# Patient Record
Sex: FEMALE | Race: WHITE | NOT HISPANIC OR LATINO | Employment: PART TIME | URBAN - METROPOLITAN AREA
[De-identification: names, ages, dates, MRNs, and addresses within clinical notes are randomized per-mention and may not be internally consistent; named-entity substitution may affect disease eponyms.]

---

## 2017-02-21 ENCOUNTER — OFFICE VISIT (OUTPATIENT)
Dept: NEUROLOGY | Facility: MEDICAL CENTER | Age: 47
End: 2017-02-21
Payer: COMMERCIAL

## 2017-02-21 VITALS
OXYGEN SATURATION: 99 % | HEART RATE: 80 BPM | TEMPERATURE: 98.2 F | SYSTOLIC BLOOD PRESSURE: 106 MMHG | BODY MASS INDEX: 20.16 KG/M2 | HEIGHT: 65 IN | DIASTOLIC BLOOD PRESSURE: 66 MMHG | WEIGHT: 121 LBS | RESPIRATION RATE: 16 BRPM

## 2017-02-21 PROCEDURE — 64615 CHEMODENERV MUSC MIGRAINE: CPT | Performed by: NURSE PRACTITIONER

## 2017-02-21 ASSESSMENT — PATIENT HEALTH QUESTIONNAIRE - PHQ9: CLINICAL INTERPRETATION OF PHQ2 SCORE: 0

## 2017-02-21 ASSESSMENT — ENCOUNTER SYMPTOMS
HEADACHES: 1
NERVOUS/ANXIOUS: 0
VOMITING: 0
NAUSEA: 0
MUSCULOSKELETAL NEGATIVE: 1
SORE THROAT: 0
COUGH: 0
DIARRHEA: 0
DEPRESSION: 0
DOUBLE VISION: 0
CONSTITUTIONAL NEGATIVE: 1
DIZZINESS: 0
ABDOMINAL PAIN: 0
SEIZURES: 0

## 2017-02-21 NOTE — PROGRESS NOTES
"Subjective:      Yu Coley is a 46 y.o. female who presents with No chief complaint on file.          HPI Here for Botox today. Doing very well and wishes to continue Botox as maintenance.  Has Imitrex for abortive treatment if needed.    Current Outpatient Prescriptions   Medication Sig Dispense Refill   • sumatriptan (IMITREX) 100 MG tablet Take 1/2-1 tablet po at onset of headache, may repeat dose in 2 hours if unrelieved.  Do not exceed more than 2 tablets in 24 hours. 9 Tab 5     No current facility-administered medications for this visit.         Review of Systems   Constitutional: Negative.    HENT: Negative for hearing loss, nosebleeds and sore throat.         No recent head injury.   Eyes: Negative for double vision.        No new loss of vision.   Respiratory: Negative for cough.         No recent lung infections.   Cardiovascular: Negative for chest pain.   Gastrointestinal: Negative for nausea, vomiting, abdominal pain and diarrhea.   Genitourinary: Negative.    Musculoskeletal: Negative.    Skin: Negative.    Neurological: Positive for headaches. Negative for dizziness and seizures.   Endo/Heme/Allergies:        No history of endocrine dysfunction.  No new problems.   Psychiatric/Behavioral: Negative for depression. The patient is not nervous/anxious.         No recent mood changes.          Objective:     /66 mmHg  Pulse 80  Temp(Src) 36.8 °C (98.2 °F)  Resp 16  Ht 1.651 m (5' 5\")  Wt 54.885 kg (121 lb)  BMI 20.14 kg/m2  SpO2 99%     Physical Exam   Constitutional: She is oriented to person, place, and time. She appears well-developed and well-nourished. No distress.   HENT:   Head: Normocephalic and atraumatic.   Nose: Nose normal.   Eyes: EOM are normal.   Neck: Normal range of motion.   Cardiovascular: Normal rate and regular rhythm.    Pulmonary/Chest: Effort normal.   Neurological: She is alert and oriented to person, place, and time. She exhibits normal muscle tone. Gait " normal.   No observable changes in neurologic status.  See initial new patient examination for details.    Skin: Skin is warm.   Psychiatric: She has a normal mood and affect.             Assessment/Plan:     Chronic Migraine:  Botox therapy has reduced patient’s migraines by more than 7 days and/or 100 hours per month.    I treated this patient in clinic today with BotoxA 155 units according to the dosing/injection paradigm currently mandated by the FDA for the management of chronic migraine. Specifically, I injected 5 units to the procerus, 5 units to the corrugators bilaterally, a total of 20 units to the frontalis musculature, 20 units to the temporalis bilaterally, 15 units to the occipitalis bilaterally, 10 units to the cervical paraspinals bilaterally and 15 units to the trapezius musculature bilaterally. The remainder of the Botox 200 units mixed but not administered was discarded as wastage per FDA guidelines.    Return for follow-up in 3 months for 9th set of Botox.

## 2017-02-21 NOTE — MR AVS SNAPSHOT
"        Yu Coley   2017 8:00 AM   Office Visit   MRN: [id number]    Department:  [facility]   Dept Phone:  [phone]    Description:  Female : 1970   Provider:  SKY Patiño           Allergies as of 2017     Allergen Noted Reactions    Aspirin 10/23/2009   Hives      You were diagnosed with     Chronic migraine   [[id number]]         Vital Signs     Blood Pressure Pulse Temperature Respirations Height Weight    106/66 mmHg 80 36.8 °C (98.2 °F) 16 1.651 m (5' 5\") 54.885 kg (121 lb)    Body Mass Index Oxygen Saturation Smoking Status             20.14 kg/m2 99% Never Smoker          Basic Information     Date Of Birth Sex Race Ethnicity Preferred Language    1970 Female White Non- English      Your appointments     May 17, 2017  8:00 AM   BOTOX with SKY Patiño   [facility] (--)    [address], [address]  [facility] [id number] [phone]              Problem List              ICD-10-CM Priority Class Noted - Resolved    Headache, common migraine G43.009   10/8/2013 - Present    Vitamin D deficiency disease E55.9   10/8/2013 - Present    Routine adult health maintenance Z00.00   2014 - Present      Health Maintenance        Date Due Completion Dates    MAMMOGRAM 2/3/2016 2/3/2015, 2013, 10/8/2011 (N/S), 2010, 2009, 2009    Override on 10/8/2011: (N/S)    IMM INFLUENZA (1) 2016 ---    PAP SMEAR 2017 (Prv Comp), 10/30/2012 (N/S)    Override on 2014: Previously completed (Dr. Posadas, normal)    Override on 10/30/2012: (N/S)    IMM DTaP/Tdap/Td Vaccine (2 - Td) 2021            Current Immunizations     Tdap Vaccine 2011      Below and/or attached are the medications your provider expects you to take. Review all of your home medications and newly ordered medications with your provider and/or pharmacist. Follow medication instructions as directed by " your provider and/or pharmacist. Please keep your medication list with you and share with your provider. Update the information when medications are discontinued, doses are changed, or new medications (including over-the-counter products) are added; and carry medication information at all times in the event of emergency situations     Allergies:  ASPIRIN - Hives               Medications  Valid as of: February 21, 2017 -  9:02 AM    Generic Name Brand Name Tablet Size Instructions for use    SUMAtriptan Succinate (Tab) IMITREX 100 MG Take 1/2-1 tablet po at onset of headache, may repeat dose in 2 hours if unrelieved.  Do not exceed more than 2 tablets in 24 hours.        .                 Medicines prescribed today were sent to:     [facility] [place] [id number] [place], [place] - [address] AT [facility] OF [employer] [facility] & [place]    [address] [place] [address]    Phone: [phone] Fax: [phone]    Open 24 Hours?: No      Medication refill instructions:       If your prescription bottle indicates you have medication refills left, it is not necessary to call your provider’s office. Please contact your pharmacy and they will refill your medication.    If your prescription bottle indicates you do not have any refills left, you may request refills at any time through one of the following ways: The online [employer] system (except Urgent Care), by calling your provider’s office, or by asking your pharmacy to contact your provider’s office with a refill request. Medication refills are processed only during regular business hours and may not be available until the next business day. Your provider may request additional information or to have a follow-up visit with you prior to refilling your medication.   *Please Note: Medication refills are assigned a new Rx number when refilled electronically. Your pharmacy may indicate that no refills were authorized even though a new prescription for the same medication is  available at the pharmacy. Please request the medicine by name with the pharmacy before contacting your provider for a refill.           MyChart Access Code: Activation code not generated  Current MyChart Status: Active

## 2017-03-30 ENCOUNTER — HOSPITAL ENCOUNTER (OUTPATIENT)
Dept: RADIOLOGY | Facility: MEDICAL CENTER | Age: 47
End: 2017-03-30
Attending: OBSTETRICS & GYNECOLOGY
Payer: COMMERCIAL

## 2017-03-30 DIAGNOSIS — N63.10 LUMP OF RIGHT BREAST: ICD-10-CM

## 2017-03-30 PROCEDURE — G0204 DX MAMMO INCL CAD BI: HCPCS

## 2017-03-30 PROCEDURE — 76642 ULTRASOUND BREAST LIMITED: CPT | Mod: RT

## 2017-05-17 ENCOUNTER — OFFICE VISIT (OUTPATIENT)
Dept: NEUROLOGY | Facility: MEDICAL CENTER | Age: 47
End: 2017-05-17
Payer: COMMERCIAL

## 2017-05-17 VITALS
SYSTOLIC BLOOD PRESSURE: 122 MMHG | WEIGHT: 118.4 LBS | HEIGHT: 65 IN | BODY MASS INDEX: 19.73 KG/M2 | DIASTOLIC BLOOD PRESSURE: 78 MMHG | TEMPERATURE: 98.4 F | HEART RATE: 93 BPM | OXYGEN SATURATION: 100 % | RESPIRATION RATE: 16 BRPM

## 2017-05-17 PROCEDURE — 64615 CHEMODENERV MUSC MIGRAINE: CPT | Performed by: NURSE PRACTITIONER

## 2017-05-17 ASSESSMENT — ENCOUNTER SYMPTOMS
SEIZURES: 0
VOMITING: 0
MUSCULOSKELETAL NEGATIVE: 1
NERVOUS/ANXIOUS: 0
HEADACHES: 1
DOUBLE VISION: 0
DEPRESSION: 0
SORE THROAT: 0
ABDOMINAL PAIN: 0
COUGH: 0
DIARRHEA: 0
CONSTITUTIONAL NEGATIVE: 1
NAUSEA: 0
DIZZINESS: 0

## 2017-05-17 NOTE — MR AVS SNAPSHOT
"        Yu Coley   2017 8:00 AM   Office Visit   MRN: [id number]    Department:  [facility]   Dept Phone:  [phone]    Description:  Female : 1970   Provider:  SKY Patiño           Reason for Visit     Botox Injection Chronic migraine      Allergies as of 2017     Allergen Noted Reactions    Aspirin 10/23/2009   Hives      You were diagnosed with     Chronic migraine   [[id number]]         Vital Signs     Blood Pressure Pulse Temperature Respirations Height Weight    122/78 mmHg 93 36.9 °C (98.4 °F) 16 1.651 m (5' 5\") 53.706 kg (118 lb 6.4 oz)    Body Mass Index Oxygen Saturation Smoking Status             19.70 kg/m2 100% Never Smoker          Basic Information     Date Of Birth Sex Race Ethnicity Preferred Language    1970 Female White Non- English      Your appointments     Aug 10, 2017  3:40 PM   BOTOX with SKY Patiño   [facility] (--)    [address] [place] [address], [address]  [facility] [id number] [phone]              Problem List              ICD-10-CM Priority Class Noted - Resolved    Headache, common migraine G43.009   10/8/2013 - Present    Vitamin D deficiency disease E55.9   10/8/2013 - Present    Routine adult health maintenance Z00.00   2014 - Present      Health Maintenance        Date Due Completion Dates    PAP SMEAR 2017 (Prv Comp), 10/30/2012 (N/S)    Override on 2014: Previously completed (Dr. Posadas, normal)    Override on 10/30/2012: (N/S)    MAMMOGRAM 3/30/2018 3/30/2017, 2/3/2015, 2013, 10/8/2011 (N/S), 2010, 2009, 2009    Override on 10/8/2011: (N/S)    IMM DTaP/Tdap/Td Vaccine (2 - Td) 2021            Current Immunizations     Tdap Vaccine 2011      Below and/or attached are the medications your provider expects you to take. Review all of your home medications and newly ordered medications with your provider and/or pharmacist. " Follow medication instructions as directed by your provider and/or pharmacist. Please keep your medication list with you and share with your provider. Update the information when medications are discontinued, doses are changed, or new medications (including over-the-counter products) are added; and carry medication information at all times in the event of emergency situations     Allergies:  ASPIRIN - Hives               Medications  Valid as of: May 17, 2017 -  8:39 AM    Generic Name Brand Name Tablet Size Instructions for use    SUMAtriptan Succinate (Tab) IMITREX 100 MG TAKE 1/2 TO 1 TABLET BY MOUTH AT ONSET OF HEADACHE, MAY REPEAT IN 2 HOURS IF UNRELIEVED*DO NOT EXCEED MORE THAN 2 TABLETS IN 24 HOURS        .                 Medicines prescribed today were sent to:     [facility] [id number]  [place], [place] - [address] AT [facility] OF [employer] & [place]    [address] [place] [address]    Phone: [phone] Fax: [phone]    Open 24 Hours?: No      Medication refill instructions:       If your prescription bottle indicates you have medication refills left, it is not necessary to call your provider’s office. Please contact your pharmacy and they will refill your medication.    If your prescription bottle indicates you do not have any refills left, you may request refills at any time through one of the following ways: The online [employer] system (except Urgent Care), by calling your provider’s office, or by asking your pharmacy to contact your provider’s office with a refill request. Medication refills are processed only during regular business hours and may not be available until the next business day. Your provider may request additional information or to have a follow-up visit with you prior to refilling your medication.   *Please Note: Medication refills are assigned a new Rx number when refilled electronically. Your pharmacy may indicate that no refills were authorized even though a new  prescription for the same medication is available at the pharmacy. Please request the medicine by name with the pharmacy before contacting your provider for a refill.        Referral     A referral request has been sent to our patient care coordination department. Please allow 3-5 business days for us to process this request and contact you either by phone or mail. If you do not hear from us by the 5th business day, please call us at [phone].           [employer] Access Code: Activation code not generated  Current [employer] Status: Active

## 2017-05-17 NOTE — PROGRESS NOTES
"Subjective:      Yu Coley is a 46 y.o. female who presents with Botox Injection            HPI  Here for Botox today.  Very pleased with current treatment plan.  Reports having one bad migraine intercurrently which is a great improvement.    Current Outpatient Prescriptions   Medication Sig Dispense Refill   • sumatriptan (IMITREX) 100 MG tablet TAKE 1/2 TO 1 TABLET BY MOUTH AT ONSET OF HEADACHE, MAY REPEAT IN 2 HOURS IF UNRELIEVED*DO NOT EXCEED MORE THAN 2 TABLETS IN 24 HOURS 9 Tab 5     No current facility-administered medications for this visit.       Review of Systems   Constitutional: Negative.    HENT: Negative for hearing loss, nosebleeds and sore throat.         No recent head injury.   Eyes: Negative for double vision.        No new loss of vision.   Respiratory: Negative for cough.         No recent lung infections.   Cardiovascular: Negative for chest pain.   Gastrointestinal: Negative for nausea, vomiting, abdominal pain and diarrhea.   Genitourinary: Negative.    Musculoskeletal: Negative.    Skin: Negative.    Neurological: Positive for headaches. Negative for dizziness and seizures.   Endo/Heme/Allergies:        No history of endocrine dysfunction.  No new problems.   Psychiatric/Behavioral: Negative for depression. The patient is not nervous/anxious.         No recent mood changes.          Objective:     /78 mmHg  Pulse 93  Temp(Src) 36.9 °C (98.4 °F)  Resp 16  Ht 1.651 m (5' 5\")  Wt 53.706 kg (118 lb 6.4 oz)  BMI 19.70 kg/m2  SpO2 100%     Physical Exam   Constitutional: She is oriented to person, place, and time. She appears well-developed and well-nourished. No distress.   HENT:   Head: Normocephalic and atraumatic.   Nose: Nose normal.   Eyes: EOM are normal.   Neck: Normal range of motion.   Cardiovascular: Normal rate and regular rhythm.    Pulmonary/Chest: Effort normal.   Neurological: She is alert and oriented to person, place, and time. She exhibits normal muscle " tone. Gait normal.   No observable changes in neurologic status.  See initial new patient examination for details.    Skin: Skin is warm.   Psychiatric: She has a normal mood and affect.               Assessment/Plan:     Chronic Migraine:  Botox therapy has reduced patient’s migraines by more than 7 days and/or 100 hours per month.    I treated this patient in clinic today with BotoxA 155 units according to the dosing/injection paradigm currently mandated by the FDA for the management of chronic migraine. Specifically, I injected 5 units to the procerus, 5 units to the corrugators bilaterally, a total of 20 units to the frontalis musculature, 20 units to the temporalis bilaterally, 15 units to the occipitalis bilaterally, 10 units to the cervical paraspinals bilaterally and 15 units to the trapezius musculature bilaterally. The remainder of the Botox 200 units mixed but not administered was discarded as wastage per FDA guidelines.    Return for follow-up in 3 months for serial set of Botox.

## 2017-08-10 ENCOUNTER — OFFICE VISIT (OUTPATIENT)
Dept: NEUROLOGY | Facility: MEDICAL CENTER | Age: 47
End: 2017-08-10
Payer: COMMERCIAL

## 2017-08-10 VITALS
TEMPERATURE: 98.4 F | DIASTOLIC BLOOD PRESSURE: 60 MMHG | BODY MASS INDEX: 19.51 KG/M2 | OXYGEN SATURATION: 99 % | RESPIRATION RATE: 16 BRPM | HEIGHT: 65 IN | HEART RATE: 100 BPM | WEIGHT: 117.1 LBS | SYSTOLIC BLOOD PRESSURE: 100 MMHG

## 2017-08-10 PROCEDURE — 64615 CHEMODENERV MUSC MIGRAINE: CPT | Performed by: NURSE PRACTITIONER

## 2017-08-10 NOTE — PROGRESS NOTES
Subjective:      Yu Coley is a 46 y.o. female who presents with Botox Injection          HPI  Here for Botox today.    Current Outpatient Prescriptions   Medication Sig Dispense Refill   • sumatriptan (IMITREX) 100 MG tablet TAKE 1/2 TO 1 TABLET BY MOUTH AT ONSET OF HEADACHE, MAY REPEAT IN 2 HOURS IF UNRELIEVED*DO NOT EXCEED MORE THAN 2 TABLETS IN 24 HOURS 9 Tab 5     No current facility-administered medications for this visit.         ROS       Objective:     There were no vitals taken for this visit.     Physical Exam            Assessment/Plan:     Chronic Migraine:  Botox therapy has reduced patient’s migraines by more than 7 days and/or 100 hours per month.    I treated this patient in clinic today with BotoxA 155 units according to the dosing/injection paradigm currently mandated by the FDA for the management of chronic migraine. Specifically, I injected 5 units to the procerus, 5 units to the corrugators bilaterally, a total of 20 units to the frontalis musculature, 20 units to the temporalis bilaterally, 15 units to the occipitalis bilaterally, 10 units to the cervical paraspinals bilaterally and 15 units to the trapezius musculature bilaterally. The remainder of the Botox 200 units mixed but not administered was discarded as wastage per FDA guidelines.    Return for follow-up in 3 months for serial set of Botox.

## 2017-08-10 NOTE — MR AVS SNAPSHOT
"        Yu Villalobos Brijesh   8/10/2017 3:40 PM   Office Visit   MRN: [id number]    Department:  [facility]   Dept Phone:  [phone]    Description:  Female : 1970   Provider:  SKY Patiño           Reason for Visit     Botox Injection Chronic migraine      Allergies as of 8/10/2017     Allergen Noted Reactions    Aspirin 10/23/2009   Hives      You were diagnosed with     Chronic migraine   [[id number]]         Vital Signs     Blood Pressure Pulse Temperature Respirations Height Weight    100/60 mmHg 100 36.9 °C (98.4 °F) 16 1.651 m (5' 5\") 53.116 kg (117 lb 1.6 oz)    Body Mass Index Oxygen Saturation Smoking Status             19.49 kg/m2 99% Never Smoker          Basic Information     Date Of Birth Sex Race Ethnicity Preferred Language    1970 Female White Non- English      Problem List              ICD-10-CM Priority Class Noted - Resolved    Headache, common migraine G43.009   10/8/2013 - Present    Vitamin D deficiency disease E55.9   10/8/2013 - Present    Routine adult health maintenance Z00.00   2014 - Present      Health Maintenance        Date Due Completion Dates    IMM INFLUENZA (1) 2017 ---    PAP SMEAR 2017 (Prv Comp), 10/30/2012 (N/S)    Override on 2014: Previously completed (Dr. Posadas, normal)    Override on 10/30/2012: (N/S)    MAMMOGRAM 3/30/2018 3/30/2017, 2/3/2015, 2013, 10/8/2011 (N/S), 2010, 2009, 2009    Override on 10/8/2011: (N/S)    IMM DTaP/Tdap/Td Vaccine (2 - Td) 2021            Current Immunizations     Tdap Vaccine 2011      Below and/or attached are the medications your provider expects you to take. Review all of your home medications and newly ordered medications with your provider and/or pharmacist. Follow medication instructions as directed by your provider and/or pharmacist. Please keep your medication list with you and share with your provider. Update the " information when medications are discontinued, doses are changed, or new medications (including over-the-counter products) are added; and carry medication information at all times in the event of emergency situations     Allergies:  ASPIRIN - Hives               Medications  Valid as of: August 10, 2017 -  4:05 PM    Generic Name Brand Name Tablet Size Instructions for use    SUMAtriptan Succinate (Tab) IMITREX 100 MG TAKE 1/2 TO 1 TABLET BY MOUTH AT ONSET OF HEADACHE, MAY REPEAT IN 2 HOURS IF UNRELIEVED*DO NOT EXCEED MORE THAN 2 TABLETS IN 24 HOURS        .                 Medicines prescribed today were sent to:     [facility] [id number] [place], [place] - [address] AT [facility] OF [address] & [place]    [address] [place] [address]    Phone: [phone] Fax: [phone]    Open 24 Hours?: No      Medication refill instructions:       If your prescription bottle indicates you have medication refills left, it is not necessary to call your provider’s office. Please contact your pharmacy and they will refill your medication.    If your prescription bottle indicates you do not have any refills left, you may request refills at any time through one of the following ways: The online [employer] system (except Urgent Care), by calling your provider’s office, or by asking your pharmacy to contact your provider’s office with a refill request. Medication refills are processed only during regular business hours and may not be available until the next business day. Your provider may request additional information or to have a follow-up visit with you prior to refilling your medication.   *Please Note: Medication refills are assigned a new Rx number when refilled electronically. Your pharmacy may indicate that no refills were authorized even though a new prescription for the same medication is available at the pharmacy. Please request the medicine by name with the pharmacy before contacting your provider for a  refill.           MyChart Access Code: Activation code not generated  Current [employer] Status: Active

## 2017-09-20 ENCOUNTER — TELEPHONE (OUTPATIENT)
Dept: NEUROLOGY | Facility: MEDICAL CENTER | Age: 47
End: 2017-09-20

## 2017-09-20 NOTE — TELEPHONE ENCOUNTER
Called in   sumatriptan (IMITREX) 100 MG tablet 9 Tab 5/5      Sig: TAKE 1/2 TO 1 TABLET BY MOUTH AT ONSET OF HEADACHE, MAY REPEAT IN 2 HOURS IF UNRELIEVED*DO NOT EXCEED MORE THAN 2 TABLETS IN 24 HOURS      [facility] [address] [facility], [place] - [address] AT [facility] OF [address] & [address] [place]: [phone]

## 2017-11-03 ENCOUNTER — OFFICE VISIT (OUTPATIENT)
Dept: NEUROLOGY | Facility: MEDICAL CENTER | Age: 47
End: 2017-11-03
Payer: COMMERCIAL

## 2017-11-03 VITALS
OXYGEN SATURATION: 100 % | BODY MASS INDEX: 20.06 KG/M2 | TEMPERATURE: 98.6 F | SYSTOLIC BLOOD PRESSURE: 110 MMHG | WEIGHT: 120.4 LBS | HEART RATE: 90 BPM | DIASTOLIC BLOOD PRESSURE: 70 MMHG | HEIGHT: 65 IN

## 2017-11-03 PROCEDURE — 64615 CHEMODENERV MUSC MIGRAINE: CPT | Performed by: NURSE PRACTITIONER

## 2017-11-03 NOTE — PROGRESS NOTES
"Subjective:      Yu Coley is a 46 y.o. female who presents with Botox Injection (chronic migraine)            HPI Here for Botox today.    Current Outpatient Prescriptions   Medication Sig Dispense Refill   • sumatriptan (IMITREX) 100 MG tablet TAKE 1/2 TO 1 TABLET BY MOUTH AT ONSET OF HEADACHE, MAY REPEAT IN 2 HOURS IF UNRELIEVED*DO NOT EXCEED MORE THAN 2 TABLETS IN 24 HOURS 9 Tab 5     Current Facility-Administered Medications   Medication Dose Route Frequency Provider Last Rate Last Dose   • onabotulinum toxin type A SOLR 155 Units  155 Units Injection Once SKY Patiño       Objective:     Ht 1.651 m (5' 5\")   Wt 54.6 kg (120 lb 6.4 oz)   BMI 20.04 kg/m²      Physical Exam            Assessment/Plan:     Chronic Migraine:  Botox therapy has reduced patient’s migraines by more than 7 days and/or 100 hours per month.    I treated this patient in clinic today with BotoxA 155 units according to the dosing/injection paradigm currently mandated by the FDA for the management of chronic migraine. Specifically, I injected 5 units to the procerus, 5 units to the corrugators bilaterally, a total of 20 units to the frontalis musculature, 20 units to the temporalis bilaterally, 15 units to the occipitalis bilaterally, 10 units to the cervical paraspinals bilaterally and 15 units to the trapezius musculature bilaterally. The remainder of the Botox 200 units mixed but not administered was discarded as wastage per FDA guidelines.    Return for follow-up in 3 months for serial set of Botox.    "

## 2017-12-13 ENCOUNTER — HOSPITAL ENCOUNTER (EMERGENCY)
Facility: MEDICAL CENTER | Age: 47
End: 2017-12-13
Attending: EMERGENCY MEDICINE
Payer: COMMERCIAL

## 2017-12-13 VITALS
SYSTOLIC BLOOD PRESSURE: 112 MMHG | TEMPERATURE: 98.1 F | RESPIRATION RATE: 16 BRPM | HEIGHT: 65 IN | HEART RATE: 82 BPM | OXYGEN SATURATION: 98 % | BODY MASS INDEX: 19.94 KG/M2 | DIASTOLIC BLOOD PRESSURE: 69 MMHG | WEIGHT: 119.71 LBS

## 2017-12-13 DIAGNOSIS — G43.909 MIGRAINE WITHOUT STATUS MIGRAINOSUS, NOT INTRACTABLE, UNSPECIFIED MIGRAINE TYPE: ICD-10-CM

## 2017-12-13 LAB — EKG IMPRESSION: NORMAL

## 2017-12-13 PROCEDURE — 700105 HCHG RX REV CODE 258: Performed by: EMERGENCY MEDICINE

## 2017-12-13 PROCEDURE — 700111 HCHG RX REV CODE 636 W/ 250 OVERRIDE (IP): Performed by: EMERGENCY MEDICINE

## 2017-12-13 PROCEDURE — 96374 THER/PROPH/DIAG INJ IV PUSH: CPT

## 2017-12-13 PROCEDURE — 96361 HYDRATE IV INFUSION ADD-ON: CPT

## 2017-12-13 PROCEDURE — 99284 EMERGENCY DEPT VISIT MOD MDM: CPT

## 2017-12-13 PROCEDURE — 93005 ELECTROCARDIOGRAM TRACING: CPT

## 2017-12-13 PROCEDURE — 96375 TX/PRO/DX INJ NEW DRUG ADDON: CPT

## 2017-12-13 RX ORDER — SODIUM CHLORIDE 9 MG/ML
1000 INJECTION, SOLUTION INTRAVENOUS ONCE
Status: COMPLETED | OUTPATIENT
Start: 2017-12-13 | End: 2017-12-13

## 2017-12-13 RX ORDER — DIPHENHYDRAMINE HYDROCHLORIDE 50 MG/ML
25 INJECTION INTRAMUSCULAR; INTRAVENOUS ONCE
Status: COMPLETED | OUTPATIENT
Start: 2017-12-13 | End: 2017-12-13

## 2017-12-13 RX ORDER — DEXAMETHASONE SODIUM PHOSPHATE 4 MG/ML
10 INJECTION, SOLUTION INTRA-ARTICULAR; INTRALESIONAL; INTRAMUSCULAR; INTRAVENOUS; SOFT TISSUE ONCE
Status: COMPLETED | OUTPATIENT
Start: 2017-12-13 | End: 2017-12-13

## 2017-12-13 RX ORDER — METOCLOPRAMIDE HYDROCHLORIDE 5 MG/ML
10 INJECTION INTRAMUSCULAR; INTRAVENOUS ONCE
Status: COMPLETED | OUTPATIENT
Start: 2017-12-13 | End: 2017-12-13

## 2017-12-13 RX ORDER — SODIUM CHLORIDE 9 MG/ML
20 INJECTION, SOLUTION INTRAVENOUS ONCE
Status: DISCONTINUED | OUTPATIENT
Start: 2017-12-13 | End: 2017-12-13 | Stop reason: HOSPADM

## 2017-12-13 RX ORDER — ACETAMINOPHEN 500 MG
1000 TABLET ORAL EVERY 6 HOURS PRN
COMMUNITY

## 2017-12-13 RX ADMIN — DEXAMETHASONE SODIUM PHOSPHATE 10 MG: 4 INJECTION, SOLUTION INTRAMUSCULAR; INTRAVENOUS at 17:12

## 2017-12-13 RX ADMIN — DIPHENHYDRAMINE HYDROCHLORIDE 25 MG: 50 INJECTION, SOLUTION INTRAMUSCULAR; INTRAVENOUS at 17:09

## 2017-12-13 RX ADMIN — SODIUM CHLORIDE 1000 ML: 9 INJECTION, SOLUTION INTRAVENOUS at 17:09

## 2017-12-13 RX ADMIN — METOCLOPRAMIDE 10 MG: 5 INJECTION, SOLUTION INTRAMUSCULAR; INTRAVENOUS at 17:10

## 2017-12-13 ASSESSMENT — PAIN SCALES - GENERAL
PAINLEVEL_OUTOF10: 3
PAINLEVEL_OUTOF10: 9

## 2017-12-13 NOTE — ED NOTES
"Chief Complaint   Patient presents with   • Migraine   • N/V     unable to tolerate prescriptions     /69   Pulse 88   Temp 36.7 °C (98.1 °F)   Resp 16   Ht 1.651 m (5' 5\")   Wt 54.3 kg (119 lb 11.4 oz)   SpO2 100%   BMI 19.92 kg/m²     "

## 2017-12-14 NOTE — ED PROVIDER NOTES
ED Provider Note    CHIEF COMPLAINT  Chief Complaint   Patient presents with   • Migraine   • N/V     unable to tolerate prescriptions       HPI  Yu Coley is a 46 y.o. female with a history of migraine headaches, ovarian cysts, dyspepsia who presents with complaint of a migraine type headache since awaking this morning. The patient says she woke around 12 noon with a severe headache to the right side of her head with radiation to the occipital area. She says this headache is similar to previous migraines except for the radiation. He has had both nausea and vomiting has not been keeping any food or fluids down. She tried taking Imitrex, but vomited the medication. She has not been ill with any fever, chills, sore throat, cough, congestion, any difficulty breathing. Denies any numbness, tingling, or any focal weakness.    REVIEW OF SYSTEMS  See HPI for further details. All other systems are negative.     PAST MEDICAL HISTORY  Past Medical History:   Diagnosis Date   • Allergy    • Dyspepsia    • Migraine    • Ovarian cyst, right    • Routine adult health maintenance 8/5/2014       FAMILY HISTORY  Family History   Problem Relation Age of Onset   • Cancer Maternal Grandmother      lung   • Hypertension Paternal Grandmother    • Hyperlipidemia Paternal Grandmother        SOCIAL HISTORY  Social History     Social History   • Marital status:      Spouse name: Huber   • Number of children: 1D   • Years of education: 1y college     Occupational History   • Manager/ at [employer]     Social History Main Topics   • Smoking status: Never Smoker   • Smokeless tobacco: Never Used   • Alcohol use Yes      Comment: one drink every 3 months   • Drug use: No   • Sexual activity: Yes     Partners: Male     Birth control/ protection: Surgical      Comment: vasectomy     Other Topics Concern   • Not on file     Social History Narrative   • No narrative on file       SURGICAL  "HISTORY  Past Surgical History:   Procedure Laterality Date   • BREAST BIOPSY  1/11/2011    right fibroadenoma, Dr. Gerry Bob   • PELVISCOPY  10/28/2009    Performed by MIMI LOPEZ at SURGERY SAME DAY [facility] ORS   • OOPHORECTOMY  10/28/2009    Performed by MIMI LOPEZ at SURGERY SAME DAY [facility] ORS   • DENTAL EXTRACTION(S)  11/99    wisdom teeth extractions   • ABDOMINAL EXPLORATION         CURRENT MEDICATIONS  Home Medications     Reviewed by Deborah Rodrigues (Pharmacy Tech) on 12/13/17 at 1604  Med List Status: Complete   Medication Last Dose Status   acetaminophen (TYLENOL) 500 MG Tab 12/11/2017 Active   sumatriptan (IMITREX) 100 MG tablet 12/13/2017 Active                ALLERGIES  Allergies   Allergen Reactions   • Aspirin Hives     Pt states \"I get hives and a fever\".         PHYSICAL EXAM  VITAL SIGNS: /69   Pulse 86   Temp 36.7 °C (98.1 °F)   Resp 16   Ht 1.651 m (5' 5\")   Wt 54.3 kg (119 lb 11.4 oz)   SpO2 99%   BMI 19.92 kg/m²   Constitutional: Awake, alert, sitting in a dark room, appears mildly uncomfortable, Non-toxic appearance.   HENT: Atraumatic. Bilateral external ears normal, mucous membranes mildly dry, throat nonerythematous without exudates, nose is normal.  Eyes: PERRL, EOMI, conjunctiva moist, noninjected.  Neck: Nontender, Normal range of motion, No nuchal rigidity, No stridor.   Lymphatic: No lymphadenopathy noted.   Cardiovascular: Regular rate and rhythm, no murmurs, rubs, gallops.  Thorax & Lungs:  Good breath sounds bilaterally, no wheezes, rales, or retractions.  No chest tenderness.  Abdomen: Bowel sounds normal, Soft, nontender, nondistended, no rebound, guarding, masses.  Back: No CVA or spinal tenderness.  Extremities: Intact distal pulses, No edema, No tenderness.   Skin: Warm, Dry, No rashes.   Musculoskeletal: No joint swelling or tenderness.  Neurologic: Alert & oriented x 3, cranial nerves II through XII intact, sensory and motor function " normal. No pronator drift, alternating finger movements are intact.  Psychiatric: Affect normal, Judgment normal, Mood normal.         COURSE & MEDICAL DECISION MAKING  Pertinent Labs & Imaging studies reviewed. (See chart for details)  The patient presents with above complaints. He seems very typical for previous migraine headaches. She has no signs of a subarachnoid hemorrhage, no fever, no meningismus signs. IV is placed is given a bolus of normal saline due to her nausea and vomiting. He is given a dose of Benadryl, Reglan, Decadron.  On reexamination the patient was feeling significantly improved. She was able tolerate oral fluids. The patient will be discharged and instructed to follow with her PCP, return to the ER for any worsening symptoms, recurrent vomiting, fever, or any other problems.    FINAL IMPRESSION  1. Migraine headache  2.   3.         Electronically signed by: Raz Marte, 12/13/2017 6:35 PM

## 2018-02-01 ENCOUNTER — OFFICE VISIT (OUTPATIENT)
Dept: NEUROLOGY | Facility: MEDICAL CENTER | Age: 48
End: 2018-02-01
Payer: COMMERCIAL

## 2018-02-01 VITALS
DIASTOLIC BLOOD PRESSURE: 60 MMHG | TEMPERATURE: 98.2 F | WEIGHT: 120 LBS | HEART RATE: 73 BPM | RESPIRATION RATE: 16 BRPM | OXYGEN SATURATION: 95 % | BODY MASS INDEX: 19.99 KG/M2 | SYSTOLIC BLOOD PRESSURE: 102 MMHG | HEIGHT: 65 IN

## 2018-02-01 PROCEDURE — 64615 CHEMODENERV MUSC MIGRAINE: CPT | Performed by: NURSE PRACTITIONER

## 2018-02-01 ASSESSMENT — PATIENT HEALTH QUESTIONNAIRE - PHQ9: CLINICAL INTERPRETATION OF PHQ2 SCORE: 0

## 2018-02-01 ASSESSMENT — PAIN SCALES - GENERAL: PAINLEVEL: NO PAIN

## 2018-02-01 NOTE — PROGRESS NOTES
"Subjective:      Yu Coley is a 47 y.o. female who presents with Botox Injection (Chronic migraine)            HPI  Here for Botox today.    Current Outpatient Prescriptions   Medication Sig Dispense Refill   • acetaminophen (TYLENOL) 500 MG Tab Take 1,000 mg by mouth every 6 hours as needed for Moderate Pain.     • sumatriptan (IMITREX) 100 MG tablet TAKE 1/2 TO 1 TABLET BY MOUTH AT ONSET OF HEADACHE, MAY REPEAT IN 2 HOURS IF UNRELIEVED*DO NOT EXCEED MORE THAN 2 TABLETS IN 24 HOURS 9 Tab 5     No current facility-administered medications for this visit.        ROS       Objective:     /60   Pulse 73   Temp 36.8 °C (98.2 °F)   Resp 16   Ht 1.651 m (5' 5\")   Wt 54.4 kg (120 lb)   SpO2 95%   BMI 19.97 kg/m²      Physical Exam            Assessment/Plan:     Chronic Migraine:  Botox therapy has reduced patient’s migraines by more than 7 days and/or 100 hours per month.     I treated this patient in clinic today with BotoxA 155 units according to the dosing/injection paradigm currently mandated by the FDA for the management of chronic migraine. Specifically, I injected 5 units to the procerus, 5 units to the corrugators bilaterally, a total of 20 units to the frontalis musculature, 20 units to the temporalis bilaterally, 15 units to the occipitalis bilaterally, 10 units to the cervical paraspinals bilaterally and 15 units to the trapezius musculature bilaterally. The remainder of the Botox 200 units mixed but not administered was discarded as wastage per FDA guidelines.     Return for follow-up in 3 months for serial set of Botox.       "

## 2018-03-12 NOTE — TELEPHONE ENCOUNTER
Was the patient seen in the last year in this department? Yes  2/1/18    Does patient have an active prescription for medications requested? Yes     Received Request Via: Pharmacy    Next scheduled follow up appointment: 5/3/18

## 2018-03-13 RX ORDER — SUMATRIPTAN 100 MG/1
TABLET, FILM COATED ORAL
Qty: 9 TAB | Refills: 5 | Status: SHIPPED | OUTPATIENT
Start: 2018-03-13 | End: 2018-09-05 | Stop reason: SDUPTHER

## 2018-04-11 DIAGNOSIS — G43.709 CHRONIC MIGRAINE W/O AURA W/O STATUS MIGRAINOSUS, NOT INTRACTABLE: ICD-10-CM

## 2018-09-10 ENCOUNTER — TELEPHONE (OUTPATIENT)
Dept: NEUROLOGY | Facility: MEDICAL CENTER | Age: 48
End: 2018-09-10

## 2018-10-02 ENCOUNTER — OFFICE VISIT (OUTPATIENT)
Dept: NEUROLOGY | Facility: MEDICAL CENTER | Age: 48
End: 2018-10-02
Payer: COMMERCIAL

## 2018-10-02 VITALS
HEIGHT: 65 IN | WEIGHT: 121 LBS | SYSTOLIC BLOOD PRESSURE: 102 MMHG | DIASTOLIC BLOOD PRESSURE: 68 MMHG | HEART RATE: 89 BPM | BODY MASS INDEX: 20.16 KG/M2 | TEMPERATURE: 99.4 F | OXYGEN SATURATION: 99 %

## 2018-10-02 DIAGNOSIS — G43.709 CHRONIC MIGRAINE W/O AURA W/O STATUS MIGRAINOSUS, NOT INTRACTABLE: ICD-10-CM

## 2018-10-02 PROCEDURE — 64615 CHEMODENERV MUSC MIGRAINE: CPT | Performed by: PHYSICIAN ASSISTANT

## 2018-10-02 NOTE — PROGRESS NOTES
Cc:  botox injections in est patient of Marisol's > 3 months out of window for botox     treated this patient in clinic today with BotoxA 155 units according to the dosing/injection paradigm currently mandated by the FDA for the management of chronic migraine. Specifically, I injected 5 units to the procerus, 5 units to the corrugators bilaterally, a total of 20 units to the frontalis musculature, 20 units to the temporalis bilaterally, 15 units to the occipitalis bilaterally, 10 units to the cervical paraspinals bilaterally and 15 units to the trapezius musculature bilaterally.    Pt was advised of side effects associated with medication.    The remainder of the Botox 200 units mixed but not administered was discarded as wastage per FDA guidelines.

## 2019-01-15 DIAGNOSIS — G43.709 CHRONIC MIGRAINE W/O AURA W/O STATUS MIGRAINOSUS, NOT INTRACTABLE: ICD-10-CM

## 2019-07-17 RX ORDER — SUMATRIPTAN 100 MG/1
TABLET, FILM COATED ORAL
Qty: 9 TAB | Refills: 5 | Status: SHIPPED | OUTPATIENT
Start: 2019-07-17 | End: 2019-08-01 | Stop reason: SDUPTHER

## 2019-07-17 NOTE — TELEPHONE ENCOUNTER
Was the patient seen in the last year in this department? Yes Last seen by Gretchen 10/02/18    Does patient have an active prescription for medications requested? No     Received Request Via: Pharmacy     NU2U ON  11/01/19

## 2019-07-31 NOTE — TELEPHONE ENCOUNTER
Was the patient seen in the last year in this department? Yes    Does patient have an active prescription for medications requested? No     Received Request Via: Patient     Patient has appointment on Aug 14 with Dr POWELL

## 2019-08-01 RX ORDER — SUMATRIPTAN 100 MG/1
TABLET, FILM COATED ORAL
Qty: 9 TAB | Refills: 5 | Status: SHIPPED | OUTPATIENT
Start: 2019-08-01

## 2019-08-14 ENCOUNTER — OFFICE VISIT (OUTPATIENT)
Dept: NEUROLOGY | Facility: MEDICAL CENTER | Age: 49
End: 2019-08-14
Payer: COMMERCIAL

## 2019-08-14 VITALS
WEIGHT: 120 LBS | OXYGEN SATURATION: 98 % | DIASTOLIC BLOOD PRESSURE: 58 MMHG | TEMPERATURE: 98.9 F | HEIGHT: 65 IN | BODY MASS INDEX: 19.99 KG/M2 | HEART RATE: 89 BPM | RESPIRATION RATE: 12 BRPM | SYSTOLIC BLOOD PRESSURE: 101 MMHG

## 2019-08-14 DIAGNOSIS — G43.709 CHRONIC MIGRAINE W/O AURA W/O STATUS MIGRAINOSUS, NOT INTRACTABLE: ICD-10-CM

## 2019-08-14 PROCEDURE — 99213 OFFICE O/P EST LOW 20 MIN: CPT

## 2019-08-14 RX ORDER — BACLOFEN 20 MG
500 TABLET ORAL DAILY
Qty: 90 TAB | Refills: 3 | Status: SHIPPED | OUTPATIENT
Start: 2019-08-14

## 2019-08-14 RX ORDER — SUMATRIPTAN 100 MG/1
100 TABLET, FILM COATED ORAL
Qty: 30 TAB | Refills: 3 | Status: SHIPPED | OUTPATIENT
Start: 2019-08-14 | End: 2019-08-15

## 2019-08-14 ASSESSMENT — PATIENT HEALTH QUESTIONNAIRE - PHQ9: CLINICAL INTERPRETATION OF PHQ2 SCORE: 0

## 2019-08-14 NOTE — PROGRESS NOTES
Follow up for migraines     Used to see Susanne le Gretchen for Botox however lost her insurance since.    CC: Migraines    HPI  47 yo pleasant female with PMH of migraines since age 18 consisting of unilateral throbbing which usually spreads to other side of the head and involves photo and phonophobia and sometimes nausea, lasting from few h to few days.  Failed to respond to Elavil and topamax in the past.  Used to take propranolol and that did not work either,  Used to take VPA, triptanes and anti-depressants with minimal to no relief.  Currently had 16-18 days to migraines per month.  States she had never had an MRI brain.  Takes 100 mg Imitrex and NSAIDS when she has a migraine.      Review of Systems   Neurological: Positive for headaches.      has a past medical history of Allergy, Dyspepsia, Migraine, Ovarian cyst, right, and Routine adult health maintenance (8/5/2014). She also has no past medical history of Breast cancer (HCC).   Past Surgical History:   Procedure Laterality Date   • BREAST BIOPSY  1/11/2011    right fibroadenoma, Dr. Gerry Bob   • PELVISCOPY  10/28/2009    Performed by MIMI LOPEZ at SURGERY SAME DAY [facility] ORS   • OOPHORECTOMY  10/28/2009    Performed by MIMI LOPEZ at SURGERY SAME DAY [facility] ORS   • DENTAL EXTRACTION(S)  11/99    wisdom teeth extractions   • ABDOMINAL EXPLORATION       Current Outpatient Medications on File Prior to Visit   Medication Sig Dispense Refill   • acetaminophen (TYLENOL) 500 MG Tab Take 1,000 mg by mouth every 6 hours as needed for Moderate Pain.     • sumatriptan (IMITREX) 100 MG tablet TAKE 1/2 TO 1 TABLET BY MOUTH AT ONSET OF HEADACHE. MAY REPEAT IN 2 HOURS IF UNRELIEVED*. DO NOT EXCEED MORE THAN 2 TABLETS IN 24 HOURS (Patient not taking: Reported on 8/14/2019) 9 Tab 5     No current facility-administered medications on file prior to visit.      Encounter Vitals  Standard Vitals  Vitals  Blood Pressure: 101/58  Temperature: 37.2 °C (98.9  "°F)  Temp src: Temporal  Pulse: 89  Respiration: 12  Pulse Oximetry: 98 %  Height: 165.1 cm (5' 5\")  Weight: 54.4 kg (120 lb)  Encounter Vitals  Temperature: 37.2 °C (98.9 °F)  Temp src: Temporal  Blood Pressure: 101/58  Pulse: 89  Respiration: 12  Pulse Oximetry: 98 %  Weight: 54.4 kg (120 lb)  Height: 165.1 cm (5' 5\")  BMI (Calculated): 19.97  Physical exam  WELL appearing  AAO x 4   Normal affect   CN 2-12 intact   MOTOR 5/5 in all extremities  Sensory Intact to LT PP P and V t/o  Coordination intact FTN and HTS  Gait normal stance and stride     No results found for: PROTHROMBTM, INR   Lab Results   Component Value Date/Time    SODIUM 140 09/29/2016 08:03 AM    POTASSIUM 4.0 09/29/2016 08:03 AM    CHLORIDE 107 09/29/2016 08:03 AM    CO2 25 09/29/2016 08:03 AM    GLUCOSE 86 09/29/2016 08:03 AM    BUN 10 09/29/2016 08:03 AM    CREATININE 0.75 09/29/2016 08:03 AM      Lab Results   Component Value Date/Time    WBC 4.2 (L) 09/29/2016 08:03 AM    RBC 4.42 09/29/2016 08:03 AM    HEMOGLOBIN 13.9 09/29/2016 08:03 AM    HEMATOCRIT 43.3 09/29/2016 08:03 AM    MCV 98.0 (H) 09/29/2016 08:03 AM    MCH 31.4 09/29/2016 08:03 AM    MCHC 32.1 (L) 09/29/2016 08:03 AM    MPV 8.9 (L) 09/29/2016 08:03 AM    NEUTSPOLYS 49.60 09/29/2016 08:03 AM    LYMPHOCYTES 39.90 09/29/2016 08:03 AM    MONOCYTES 6.70 09/29/2016 08:03 AM    EOSINOPHILS 2.60 09/29/2016 08:03 AM    BASOPHILS 1.00 09/29/2016 08:03 AM    Imaging   None     Impression and plan  Migraine headaches  Failed numerous preventatives  Imitrex works only minimally and so did Fioricet  Has >16 days of headaches per month   Headaches mostly with migrainous characteristics   Plan  Will try Emgality (galcanezumab) as preventative- patient would like to try   Coenzyme q10 300 mg, Riboflavin 400 mg and Mag oxide 500 mg daily   Imitrex as abortive 100 mg tab limit all abortives to <15 tabs/applications per month   Botox- restart the injections   Discussed biofeedback,yoga,swimming "   Stress management    Patient was seen for 20 minutes face to face of which > 50% of appointment time was spent on counseling and coordination of care regarding the above.  Plan

## 2019-08-15 RX ORDER — SUMATRIPTAN 100 MG/1
100 TABLET, FILM COATED ORAL
Qty: 30 TAB | Refills: 3 | Status: SHIPPED | OUTPATIENT
Start: 2019-08-15

## 2019-08-18 ASSESSMENT — ENCOUNTER SYMPTOMS: HEADACHES: 1

## 2019-08-30 ENCOUNTER — TELEPHONE (OUTPATIENT)
Dept: NEUROLOGY | Facility: MEDICAL CENTER | Age: 49
End: 2019-08-30

## 2020-01-07 RX ORDER — GALCANEZUMAB 120 MG/ML
INJECTION, SOLUTION SUBCUTANEOUS
Qty: 1 ML | Refills: 0 | Status: SHIPPED | OUTPATIENT
Start: 2020-01-07

## 2022-06-17 ENCOUNTER — OFFICE VISIT (OUTPATIENT)
Dept: URGENT CARE | Facility: PHYSICIAN GROUP | Age: 52
End: 2022-06-17
Payer: COMMERCIAL

## 2022-06-17 VITALS
SYSTOLIC BLOOD PRESSURE: 102 MMHG | TEMPERATURE: 98.3 F | DIASTOLIC BLOOD PRESSURE: 60 MMHG | OXYGEN SATURATION: 100 % | BODY MASS INDEX: 20.83 KG/M2 | HEIGHT: 65 IN | HEART RATE: 90 BPM | RESPIRATION RATE: 12 BRPM | WEIGHT: 125 LBS

## 2022-06-17 DIAGNOSIS — G43.919 INTRACTABLE MIGRAINE WITHOUT STATUS MIGRAINOSUS, UNSPECIFIED MIGRAINE TYPE: ICD-10-CM

## 2022-06-17 DIAGNOSIS — G43.709 CHRONIC MIGRAINE W/O AURA W/O STATUS MIGRAINOSUS, NOT INTRACTABLE: ICD-10-CM

## 2022-06-17 PROCEDURE — 99203 OFFICE O/P NEW LOW 30 MIN: CPT | Performed by: PHYSICIAN ASSISTANT

## 2022-06-17 RX ORDER — PROCHLORPERAZINE EDISYLATE 5 MG/ML
10 INJECTION INTRAMUSCULAR; INTRAVENOUS ONCE
Status: COMPLETED | OUTPATIENT
Start: 2022-06-17 | End: 2022-06-17

## 2022-06-17 RX ORDER — DIPHENHYDRAMINE HYDROCHLORIDE 50 MG/ML
25 INJECTION INTRAMUSCULAR; INTRAVENOUS ONCE
Status: COMPLETED | OUTPATIENT
Start: 2022-06-17 | End: 2022-06-17

## 2022-06-17 RX ORDER — KETOROLAC TROMETHAMINE 30 MG/ML
30 INJECTION, SOLUTION INTRAMUSCULAR; INTRAVENOUS ONCE
Status: COMPLETED | OUTPATIENT
Start: 2022-06-17 | End: 2022-06-17

## 2022-06-17 RX ADMIN — PROCHLORPERAZINE EDISYLATE 10 MG: 5 INJECTION INTRAMUSCULAR; INTRAVENOUS at 11:54

## 2022-06-17 RX ADMIN — DIPHENHYDRAMINE HYDROCHLORIDE 25 MG: 50 INJECTION INTRAMUSCULAR; INTRAVENOUS at 11:53

## 2022-06-17 RX ADMIN — KETOROLAC TROMETHAMINE 30 MG: 30 INJECTION, SOLUTION INTRAMUSCULAR; INTRAVENOUS at 11:54

## 2022-06-17 NOTE — PROGRESS NOTES
"Subjective:   Yu Coley is a 51 y.o. female who presents for Migraine (Migraine with vomitting onset 3:30 am)   Migraine started around 330 AM; vomiting since.  Very similar to prior episodes.  No new dysarthria.  Some photophobia.  No weakness, speech disturbance, seizure-like activity.  Reports severe episodes typically respond to Reglan, Benadryl, Toradol.  Patient has past medical history of migraines since age 18 consisting of unilateral throbbing and involves photo and phonophobia with nausea lasting a few hours to few days.  Has tried Elavil and Topamax in the past.  Has tried propanolol.  Does take Imitrex and NSAIDs.  Has tried antidepressants without relief.  Does take Emgality as preventative.  Has tried Botox.      Medications:  acetaminophen Tabs  Coenzyme Q10 Caps  Emgality Soaj  Magnesium Oxide Tabs  Riboflavin Caps  sumatriptan    Allergies:             Aspirin    Surgical History:         Past Surgical History:   Procedure Laterality Date   • BREAST BIOPSY  1/11/2011    right fibroadenoma, Dr. Gerry Bob   • PELVISCOPY  10/28/2009    Performed by MIMI LOPEZ at SURGERY SAME DAY [facility] ORS   • OOPHORECTOMY  10/28/2009    Performed by MIMI LOPEZ at SURGERY SAME DAY [facility] ORS   • DENTAL EXTRACTION(S)  11/99    wisdom teeth extractions   • ABDOMINAL EXPLORATION         Past Social Hx:  Yu Coley  reports that she has never smoked. She has never used smokeless tobacco. She reports current alcohol use. She reports that she does not use drugs.     Past Family Hx:   Yu Coley family history includes Cancer in her maternal grandmother; Hyperlipidemia in her paternal grandmother; Hypertension in her paternal grandmother.       Problem list, medications, and allergies reviewed by myself today in Epic.     Objective:     /60   Pulse 90   Temp 36.8 °C (98.3 °F) (Temporal)   Resp 12   Ht 1.651 m (5' 5\")   Wt 56.7 kg (125 lb)   SpO2 100%   BMI " 20.80 kg/m²     Physical Exam  Vitals and nursing note reviewed.   Constitutional:       General: She is not in acute distress.  HENT:      Head: Normocephalic.      Right Ear: Tympanic membrane normal.      Left Ear: Tympanic membrane normal.   Eyes:      General: No visual field deficit.     Extraocular Movements: Extraocular movements intact.      Pupils: Pupils are equal, round, and reactive to light.   Cardiovascular:      Rate and Rhythm: Normal rate and regular rhythm.      Pulses: Normal pulses.   Pulmonary:      Effort: Pulmonary effort is normal. No respiratory distress.   Musculoskeletal:      Cervical back: Normal range of motion and neck supple. No rigidity.   Skin:     General: Skin is warm.      Findings: No rash.   Neurological:      General: No focal deficit present.      Mental Status: She is alert and oriented to person, place, and time. Mental status is at baseline.      GCS: GCS eye subscore is 4. GCS verbal subscore is 5. GCS motor subscore is 6.      Cranial Nerves: Cranial nerves are intact. No cranial nerve deficit, dysarthria or facial asymmetry.      Motor: Motor function is intact. No tremor, abnormal muscle tone or pronator drift.      Coordination: Coordination is intact. Romberg sign negative. Coordination normal. Finger-Nose-Finger Test normal.      Gait: Gait normal.      Deep Tendon Reflexes: Reflexes are normal and symmetric. Reflexes normal. Babinski sign absent on the right side.      Comments: She is in visible distress.  GCS 15.  No pronator drift or facial asymmetry   Psychiatric:         Attention and Perception: Attention and perception normal.         Mood and Affect: Mood and affect normal.         Speech: Speech normal.         Behavior: Behavior normal.         Thought Content: Thought content normal.         Cognition and Memory: Cognition normal.         Assessment/Plan:     Diagnosis and Associated Orders:     1. Intractable migraine without status migrainosus,  unspecified migraine type  - prochlorperazine (COMPAZINE) injection 10 mg  - diphenhydrAMINE (BENADRYL) injection 25 mg  - ketorolac (TORADOL) injection 30 mg    2. Chronic migraine w/o aura w/o status migrainosus, not intractable        Comments/MDM:  Patient presents with migraine headache.  Symptoms stay back to age 18.  States this episode feels very similar to other prior episodes.  She has seen neurology and undergone imaging.  No red flags by history or on exam.  Currently takes triptans daily and has tried Elavil, Topamax, propanolol and antidepressants.  Patient reports symptoms typically tova with Benadryl, Reglan, Toradol.  Explained we have Compazine in place of Reglan here in the clinic.  The above are administered.  Recommend follow-up with neurology.  Continue medications as prescribed by neurology.  Return precautions discussed.      I personally reviewed prior external notes and test results pertinent to today's visit.  Red flags discussed as well as indications to present to the Emergency Department.  Supportive care, natural history, differential diagnoses, and indications for immediate follow-up discussed.  Patient expresses understanding and agrees to plan.  Patient denies any other questions or concerns.    Follow-up with the primary care physician for recheck, reevaluation, and consideration of further management.      Please note that this dictation was created using voice recognition software. I have made a reasonable attempt to correct obvious errors, but I expect that there are errors of grammar and possibly content that I did not discover before finalizing the note.    This note was electronically signed by Maia Armendariz PA-C